# Patient Record
Sex: FEMALE | URBAN - METROPOLITAN AREA
[De-identification: names, ages, dates, MRNs, and addresses within clinical notes are randomized per-mention and may not be internally consistent; named-entity substitution may affect disease eponyms.]

---

## 2017-01-10 DIAGNOSIS — R13.12 OROPHARYNGEAL DYSPHAGIA: Primary | ICD-10-CM

## 2017-01-10 NOTE — NURSING NOTE
The patient is under the care of Dr Wells. She had a surgical procedure done at [facility] in November of 2016. Dr Wells is referring to one of our SLP's for evaluation and help with swallowing and voice production. We will be getting the op report into our system.

## 2017-01-20 ENCOUNTER — HOSPITAL ENCOUNTER (OUTPATIENT)
Dept: SPEECH THERAPY | Facility: CLINIC | Age: 62
Setting detail: THERAPIES SERIES
End: 2017-01-20
Attending: OTOLARYNGOLOGY
Payer: COMMERCIAL

## 2017-01-20 DIAGNOSIS — R13.12 OROPHARYNGEAL DYSPHAGIA: Primary | ICD-10-CM

## 2017-01-20 PROCEDURE — 92526 ORAL FUNCTION THERAPY: CPT | Mod: GN | Performed by: STUDENT IN AN ORGANIZED HEALTH CARE EDUCATION/TRAINING PROGRAM

## 2017-01-20 PROCEDURE — 92610 EVALUATE SWALLOWING FUNCTION: CPT | Mod: GN | Performed by: STUDENT IN AN ORGANIZED HEALTH CARE EDUCATION/TRAINING PROGRAM

## 2017-01-20 PROCEDURE — 40000211 ZZHC STATISTIC SLP  DEPARTMENT VISIT: Performed by: STUDENT IN AN ORGANIZED HEALTH CARE EDUCATION/TRAINING PROGRAM

## 2017-01-20 NOTE — PROGRESS NOTES
"[facility] Clinical Swallow Evaluation       01/20/17 1100   General Information   Type Of Visit Initial   Start Of Care Date 01/20/17   Referring Physician Ori Wells MD   Orders Evaluate And Treat   Orders Comment Per MD order: \"Patient had surgery in November with Dr. Wells at [facility], we will be getting the op report.  She is having swallowing issues as well as voice production issues.\"   Medical Diagnosis Dysphagia, Dysphonia   Onset Of Illness/injury Or Date Of Surgery 10/08/16  (surgery date, per pt report)   Precautions/limitations Swallowing Precautions   Hearing WFL for evaluation   Pertinent History of Current Problem/OT: Additional Occupational Profile Info 60yo female with PMH of nasopharyngeal carcinoma s/p radiation therapy many years ago.  Pt is currently s/p clivectomy on 3/11/2016 with Dr. Cano and sugery on 10/8/2016 with Dr. 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  Pt reports her velum was removed in this most recent surgery.  Pt has been NPO with all nutrition via G-tube since the surgery, with use of ice chips/water swish to maintain oral hydration.  Pt reports coughing/choking on thin liquids and/or nasal regurgitation of liquids if she does not occlude her nares when swallowing.  Trial of puree (yogurt) immediately following surgery felt like it got stuck in the throat.  Pt also reports that her tongue was swollen/numb initially but is now 90% back to normal with some residual numbness along the length of the tonuge on the left.  Pt had VFSS 1+ years ago which was significant for decreased epiglottic inversion, but has not had a VFSS recently.     Respiratory Status Room air   Prior Level Of Function Swallowing   Prior Level Of Function Comment Pt and daughter note that pt decreased epiglottic inversion per VFSS prior to the surgery, with occasional throat clearing and sensation of food getting stuck in " "the throat during meals.   General Observations Significant dysarthria with decreased intelligibility noted.  Flaccid dysarthria with imprecise lingual consonants and significant hypernasal resonance with inability to generate oral pressure necessary for plosives.   Patient/family Goals \"To eat again.\"   Clinical Swallow Evaluation   Oral Musculature anomalies present   Structural Abnormalities present  (lack of velum)   Dentition present and adequate   Mucosal Quality adequate  (pt uses ice chips/water to keep tissue hydrated)   Mandibular Strength and Mobility intact   Oral Labial Strength and Mobility WFL   Lingual Strength and Mobility impaired protrusion;impaired anterior elevation;impaired left lateral movement   Velar Elevation impaired  (velum not present)   Buccal Strength and Mobility intact   Laryngeal Function Cough;Throat clear;Voicing initiated;Dry swallow palpated  (decreased laryngeal elevation on palpation)   Oral Musculature Comments Oral musculature abnormalities characterized by absent velum and reduced lingual strength/ROM.  Pt reports numb sensation along the length of the tongue on the left.  Speech during evaluation is characterzied by flaccid dysarthria and significant hypernasal resonance.   Additional Documentation Yes   Clinical Swallow Eval: Thin Liquid Texture Trial   Mode of Presentation, Thin Liquids cup;self-fed   Volume of Liquid or Food Presented small sip x2   Oral Phase of Swallow other (see comments)  (needs to occlude nares to generate oral pressure for swallow)   Pharyngeal Phase of Swallow coughing/choking;throat clearing;reduction in laryngeal movement;repeated swallows   Diagnostic Statement S/sx of aspiration characterized by immediate cough/throat clear following sips of water noted on 2/2 trials.  Decreased laryngeal elevation on palpation observed.  Pt benefitted from occluding the nares to initiate the swallow.  No improvement with effortful swallow.     Swallow " Compensations   Swallow Compensations Effortful swallow   Results Suspect aspiration   Educational Assessment   Barriers to Learning No barriers   Preferred Learning Style Listening;Reading;Demonstration;Pictures/video   Esophageal Phase of Swallow   Patient reports or presents with symptoms of esophageal dysphagia No   General Therapy Interventions   Planned Therapy Interventions Dysphagia Treatment   Dysphagia treatment Oropharyngeal exercise training   Intervention Comments VFSS warranted.  Speech evaluation to be completed at next session.   Swallow Eval: Clinical Impressions   Skilled Criteria for Therapy Intervention Skilled criteria met.  Treatment indicated.   Functional Assessment Scale (FAS) 1   Dysphagia Outcome Severity Scale (BRAXTON) Level 1 - BRAXTON   Treatment Diagnosis Severe oropharygneal dysphagia   Diet texture recommendations NPO  (ice chips/water swish okay to maintain oral hydration)   Rehab Potential good, to achieve stated therapy goals   Therapy Frequency (once weekly)   Predicted Duration of Therapy Intervention (days/wks) 8   Anticipated Discharge Disposition home w/ outpatient services   Risks and Benefits of Treatment have been explained. Yes   Patient, family and/or staff in agreement with Plan of Care Yes   Clinical Impression Comments Ms. Lopez presents with severe oropharyngeal dysphagia s/p surgery on 10/8/2016 that involved removal of the velum.  Pt has been receiving all nutrition/hydration via G-tube since the October sugery.  Dysphagia is characterized by s/sx of aspiration on sips of water, with decreased laryngeal elevation, reported nasal regurgitation, and need to occlude the nares to initiate the swallow.  Pt has a nose plug, but an obturator has not been recommended by Dr. Wells at this time.  RECOMMEND: 1. repeat video swallow study. 2. Pt to remain NPO until VFSS with use of ice chips/water swish to maintain oral hydration. 3. Oropharyngeal exercises to improve strength  and promote safety with potential future PO trials.   Swallow Goals   SLP Swallow Goals 1   Swallow Goal 1   Goal Identifier Exercises   Goal Description Patient will complete oropharyngeal exercises 2-3x per day, every day, to increase strength and improve safety of future PO intake trials.   Target Date 03/21/17   Total Session Time   Total Session Time 50   Total Evaluation Time 20     Thank you for the referral of this patient.    Allison Alpers, B.A. (music), M.A., CCC-SLP  Speech-Language Pathologist  Certificate of Vocology  [facility]  [phone]

## 2017-01-23 ENCOUNTER — HOSPITAL ENCOUNTER (OUTPATIENT)
Dept: SPEECH THERAPY | Facility: CLINIC | Age: 62
Setting detail: THERAPIES SERIES
End: 2017-01-23
Attending: FAMILY MEDICINE
Payer: COMMERCIAL

## 2017-01-23 DIAGNOSIS — R13.10 DYSPHAGIA, UNSPECIFIED TYPE: Primary | ICD-10-CM

## 2017-01-23 PROCEDURE — 40000211 ZZHC STATISTIC SLP  DEPARTMENT VISIT: Performed by: SPEECH-LANGUAGE PATHOLOGIST

## 2017-01-23 PROCEDURE — 92522 EVALUATE SPEECH PRODUCTION: CPT | Mod: GN | Performed by: SPEECH-LANGUAGE PATHOLOGIST

## 2017-01-25 NOTE — PROGRESS NOTES
Adult Outpatient Speech Evaluation   01/23/17 1100       Present No   General Information   Type of Evaluation Dysarthria   Type Of Visit Initial   Start Of Care Date 01/23/17   Referring Physician Rigo Napoles MD   Orders Evaluate And Treat   Orders Comment Eval for dysarthria.   Medical Diagnosis History of head and neck cancer with surgical removal of velum for spinal repair.   Onset Of Illness/injury Or Date Of Surgery 01/16/17   Precautions/Limitations no known precautions/limitations   Hearing Patient has a hearing aid in her left ear. Her right ear has a bone conduction hearing aid that sends the sounds to the hearing aid in her left ear.   Surgical/Medical history reviewed Yes   Pertinent History Of Current Problem Naya has a complex past medical history. 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 In surgery to remove the infection and repair the spine area, the surgeon removed Naya's soft palate to use the tissue on her spine. As a result of this surgery, Naya also had numbness in her tongue, which has improved, but is still somewhat present. With her soft palate removed, Naya is experiencing dysphagia and dysarthria. Naya had a clinical swallow evaluation on 1/20/17 and has been recommended for a video swallow evaluation, She is here today regarding her speech. Naya was accompanied to this session by her daughter, who reports she understands her mother 90% of the time. They report less familiar people understand her much less. In addition, they report that the phone is nearly impossible for Naya.   Patient Role/employment History Retired  (Previously worked as a drug/alcohol counselor.)   Living environment House/[facility]  (Lives next door to her daughter, who helps out.)   Patient/family Goals To be able to be understood when she  "speaks.   FALL RISK SCREEN   Have you fallen 2 or more times in the last year? No   Have you fallen and had an injury in the past year? No   Is the patient a fall risk? No   Oral Motor Sensory Function   Comments Naya participated in a clinical swallow evaluation on 1/20/17.   Speech   Deficits in Speech Respiration Other (Comment)  (Management of respiration affected by nasal emission.)   Deficits in Phonation None   Sustained vowel production (10-12 seconds with nose plugged, 1-2 seconds without.)   S/Z Ratio 0.22  (With nose plugged. Unknown cause of discrepancy.)   Deficits in Articulation Other (Comment)  (Dysarthria almost exclusively caused by lack of soft palate.)   Deficits in Resonance Hypernasal;Nasal emission of air;Other (Comment)  (No soft palate causes no closure of nasal cavity.)   Deficits in visible velar function Other (Comment)  (Pt's velum was removed in spinal surgery.)   Deficits in [place] None   AIDS-words, % intelligible 70%   AIDS-sentences, % intelligible 86%   Speech Comments Naya presents with moderate to severe dysarthria characterized by a hypernasal quality and inability to produce velar sounds due to the patient's soft palate being removed in surgery. Additionally, following surgery, Naya reported almost no feeling in her tongue. This has gradually improved, with only \"some\" continued numbness. However, this has caused additional speech difficulty with imprecise articulation of all sounds requiring tongue movements. Naya is able to communication with familiar people such as family members with high intelligibility, but estimates less than 50% understanding with new/unfamiliar people. She also reports the phone to be \"nearly impossible\". Naya has self identified some strategies including plugging her nose and mouthing words when she is not understood. She is highly motivated and open to any strategies that can improve her intelligibility.   Language: Auditory " Comprehension (understanding of spoken language)   Comments (Auditory Comprehension) [place] auditory comprehension is functional for all needs.   Language: Verbal Expression (use of spoken language to express information)   Comments (Verbal Expression) [place] verbal expression is functional for all needs. However, her dysarthria does affect her overall intelligibility.   Pragmatics (the social or functional use of a language)   Deficits noted in Nonverbal None   Cognitive Status Examination   Cognitive Status Exam Comments No cognitive deficits observed or reported. Concerns are solely speech related.   Education Assessment   Barriers to Learning No barriers   General Therapy Interventions   Planned Therapy Interventions Communication  (Pt's is also being followed for dysphagia.)   Communication Improve speech intelligibility   Clinical Impression, SLP Eval   Criteria for Skilled Therapeutic Interventions Met yes;treatment indicated   SLP Diagnosis Moderate to severe dysarthria characterized by hypernasal speech, inability to produce velar sounds and imprecise articulation.   Influenced by the following factors/impairments Soft palate was surgically removed.   Functional limitations due to impairments Deficits in speech intelligibility and swallowing.   Therapy Frequency 1 time;per week   Predicted Duration of Therapy Intervention (days/wks) (To be determined based on course of treatment.)   Risks and Benefits of Treatment have been explained. Yes   Patient, Family & other staff in agreement with plan of care Yes   Cognitive/Communication Goals   Cognitive/Communication Goals 1;2   Cognitive/Communication Goal 1   Goal Identifier compensation   Goal Description Naya will trial provided compensatory strategies for improved speech intelligibility.   Target Date 04/23/17   Cognitive/Communication Goal 2   Goal Identifier function   Goal Description Naya will report functional speech with a variety of  listeners in a variety of environments.   Target Date 04/23/17   Total Session Time   Total Evaluation Time 55     Iram Murphy MA, [facility]-SLP  [facility]  [phone] (Phone)  [phone] (Fax)

## 2017-01-31 NOTE — ADDENDUM NOTE
Encounter addended by: Iram Murphy, SLP on: 1/31/2017  9:33 AM<BR>     Documentation filed: Episodes

## 2017-03-09 ENCOUNTER — HOSPITAL ENCOUNTER (OUTPATIENT)
Dept: SPEECH THERAPY | Facility: CLINIC | Age: 62
Discharge: HOME | End: 2017-03-09
Attending: OTOLARYNGOLOGY | Admitting: OTOLARYNGOLOGY
Payer: COMMERCIAL

## 2017-03-09 ENCOUNTER — HOSPITAL ENCOUNTER (OUTPATIENT)
Dept: GENERAL RADIOLOGY | Facility: CLINIC | Age: 62
End: 2017-03-09
Attending: OTOLARYNGOLOGY
Payer: COMMERCIAL

## 2017-03-09 DIAGNOSIS — R13.12 OROPHARYNGEAL DYSPHAGIA: ICD-10-CM

## 2017-03-09 PROCEDURE — 40000211 ZZHC STATISTIC SLP  DEPARTMENT VISIT

## 2017-03-09 PROCEDURE — 74230 X-RAY XM SWLNG FUNCJ C+: CPT

## 2017-03-09 PROCEDURE — 92611 MOTION FLUOROSCOPY/SWALLOW: CPT | Mod: GN

## 2017-03-10 NOTE — PROGRESS NOTES
Impressions: The patient was unable to clear thin or nectar thick liquids. Swallow trials resulted in large quantities of pharyngeal residue. Only a small portion of liquids were cleared from the pharyngeal cavity. Nectar thick liquids were more difficult to clear compared to thin liquids; however, the patient reported that it felt better.  Little to no epiglottis movement was observed. The patient was able to protect airway with cough. Penetration was observed with thin and nectar thick liquids. The patient presented with residue on the posterior side of the epiglottis. Decreased ROM for laryngeal elevation was noted, as well as a decreased pharyngeal stripping wave. Effortful swallow, occluding nares to increase intraoral pressure, chin tuck, and multiple swallows were attempted. Outcome of residue and swallow was not greatly affected with strategies. Patient reported sensation in throat when liquids were not cleared. ST did not trial thicker consistencies as the patient was unable to clear thin and nectar thick from the pharyngeal cavity.   03/09/17  Evaluation: VFSS       Present No   General Information   Type Of Visit Initial   Start Of Care Date 03/09/17   Referring Physician Ori Wells MD   Orders Evaluate And Treat   Medical Diagnosis Oropharyngeal dysphagia   Onset Of Illness/injury Or Date Of Surgery 03/09/17   Precautions/limitations Swallowing Precautions  (H&N cancer/ g-tube/ )   Hearing Hard of Hearing/ wears hearing aides.    Pertinent History of Current Problem/OT: Additional Occupational Profile Info The patient's medical history is complicated.  She had cancer of the nose. The patient underwent surgery in November, where her soft palate was removed. Patient received radiation in past. Patient is receiving nutrients via g-tube. Patient is being seen for OP therapy.    Respiratory Status Room air   Prior Level Of Function Swallowing   Prior Level Of Function  Comment Prior to surgery the patient was PO with no swallowing deficits reported. Patient has been NPO since surgery.    Patient Role/employment History Retired   Living Environment [facility] Observations The patient was alert and oriented at time of evaluation. She reports that she motivated to work toward a PO diet. Patient demonstrated techniques discussed in her therapy session, and verbalized understanding of procedure.    Patient/family Goals Patient would like to eat a regular diet.    Pain Assessment   Pain Reported No   FALL RISK SCREEN   Have you fallen 2 or more times in the last year? No   Have you fallen and had an injury in the past year? No   Is the patient a fall risk? No   Fall screen completed by SLP   Comments Patient walked independent of aid from waiting room to imaging.    Clinical Swallow Evaluation   Oral Musculature anomalies present   Structural Abnormalities present   Dentition present and adequate   Mucosal Quality good   Mandibular Strength and Mobility intact   Oral Labial Strength and Mobility WFL   Lingual Strength and Mobility impaired protrusion;impaired anterior elevation;impaired left lateral movement   Velar Elevation impaired  (absent)   Buccal Strength and Mobility intact   Laryngeal Function Cough;Swallow;Throat clear;Dry swallow palpated;Voicing initiated   Additional Documentation No   VFSS Evaluation   VFSS Additional Documentation Yes   VFSS Eval: Thin Liquid Texture Trial   Mode of Presentation, Thin Liquid cup;self-fed   Order of Presentation 1,2,3   Oral Phase, Thin Liquid Poor AP movement   Pharyngeal Phase, Thin Liquid Pharyngeal wall coating;Residue in valleculae;Residue in pyriform sinus   Rosenbek's Penetration Aspiration Scale: Thin Liquid Trial Results 3 - contrast remains above the vocal cords, visible residue remains (penetration)   Response to Aspiration productive volitional cough following clinician cue   Successful Strategies Trialed During  Procedure, Thin Liquid chin tuck;hard swallow  (Plugging nose)   Diagnostic Statement The patient participated in 3 trials of thin liquids. The first, she attempted to swallow without compensatory strategies. The patient consumed a small sip. The patient required multiple swallows to with very little fluid being transferred to esophagus. The patient demonstrated little to no epiglottis movement. Pharyngeal wave was also significantly reduced. The patient presented effortful swallow with multiple swallows elicited. Patient was unable to clear thin liquids.  With significant effort, patient was able to pass a small portion of thin liquids into esophagus. Remainder of liquids sits in pharyngeal cavity. Penetration noted, but no aspiration was noted. Residue of epiglottis and pharyngeal cavity was present.    VFSS Eval: Nectar Thick Liquid Texture Trial   Mode of Presentation, Nectar cup   Order of Presentation 4, 5,    Preparatory Phase WFL   Oral Phase, [place] Poor AP movement   Pharyngeal Phase, Nectar Pharyngeal wall coating;Residue in valleculae;Residue in pyriform sinus   Rosenbek's Penetration Aspiration Scale: Nectar-Thick Liquid Trial Results 3 - contrast remains above the vocal cords, visible residue remains (penetration)   Response to Aspiration, Nectar productive volitional cough following clinician cue   Successful Strategies Trialed During Procedure, Nectar chin tuck;hard swallow;multiple swallows  (nasal occlusion)   Diagnostic Statement The patient consumed 2 trials of nectar thick liquid. Nectar this liquid appeared to have increased difficulty with swallow. Multiple swallows were required to clear a portion of the sip. The patient presented with little to no epiglottis movement. Excess residue was noted in pharyngeal cavity. The patient was instructed to use an effortful swallow to assist in clearing the liquid. A small portion of the sip was cleared, with the remainder of the liquid sticking in  pharyngeal cavity. A large quantity of the nectar thick liquid remained in pharyngeal cavity, unable to be cleared. Nectar thick was trialed twice with the same results at each attempt. The patient reported a sensation of something being stuck in her throat when this was occurring.    Swallow Compensations   Swallow Compensations Chin tuck;Effortful swallow;Reduce amounts;Multiple swallow  (Increased amount)   Results (Penetrations; residue; unable to clear liquids)   Educational Assessment   Barriers to Learning No barriers   Preferred Learning Style Demonstration;Listening   Esophageal Phase of Swallow   Patient reports or presents with symptoms of esophageal dysphagia No   Esophageal sweep performed during today s vidofluoroscopic exam  No   Swallow Eval: Clinical Impressions   Skilled Criteria for Therapy Intervention (Therapy at another site. )   Functional Assessment Scale (FAS) 1   Dysphagia Outcome Severity Scale (BRAXTON) Level 1 - BRAXTON   Treatment Diagnosis Dysphagia   OT: Assessment of Occupational Performance 3-5 Performance Deficits   OT: Identified Performance Deficits Decreased ROM with epiglottis; decreased pharyngeal wave; decreased laryngeal movement   OT: Clinical Decision Making (Complexity) High complexity   Diet texture recommendations NPO   Anticipated Discharge Disposition home   Risks and Benefits of Treatment have been explained. Yes   Patient, family and/or staff in agreement with Plan of Care Yes   Clinical Impression Comments The patient was unable to clear thin or nectar thick liquids. Swallow trials resulted in large quantities of pharyngeal residue. Only a small portion of liquids were cleared into the esophagus. Nectar thick liquids were more difficult to clear compared to thin liquids; however, the patient reported that it felt better.  Little to no epiglottis movement was observed. The patient was able to protect airway with cough. Penetration was observed with thin and nectar thick  liquids. The patient presented with residue on the posterior side of the epiglottis. Decreased ROM for laryngeal elevation was noted, as well as a decreased pharyngeal stripping wave. Effortful swallow, occluding nares to increase intraoral pressure, chin tuck, and multiple swallows were attempted. Outcome of residue and swallow was not greatly affected with strategies. Patient reported sensation in throat when liquids were not cleared. ST did not trial thicker consistencies as the patient was unable to clear thin and nectar thick from the pharyngeal cavity.   Total Session Time   Total Session Time 30   Total Evaluation Time 25   Therapy Certification   Certification date from 03/10/17   Certification date to 03/10/17   Medical Diagnosis Dysphagia   Certification I certify the need for these services furnished under this plan of treatment and while under my care.  (Physician co-signature of this document indicates review and certification of the therapy plan).

## 2017-05-18 ENCOUNTER — HOSPITAL ENCOUNTER (OUTPATIENT)
Dept: SPEECH THERAPY | Facility: CLINIC | Age: 62
Setting detail: THERAPIES SERIES
End: 2017-05-18
Attending: FAMILY MEDICINE
Payer: COMMERCIAL

## 2017-05-18 PROCEDURE — 40000211 ZZHC STATISTIC SLP  DEPARTMENT VISIT: Performed by: STUDENT IN AN ORGANIZED HEALTH CARE EDUCATION/TRAINING PROGRAM

## 2017-05-18 PROCEDURE — 92507 TX SP LANG VOICE COMM INDIV: CPT | Mod: GN | Performed by: STUDENT IN AN ORGANIZED HEALTH CARE EDUCATION/TRAINING PROGRAM

## 2017-05-18 PROCEDURE — 92526 ORAL FUNCTION THERAPY: CPT | Mod: GN | Performed by: STUDENT IN AN ORGANIZED HEALTH CARE EDUCATION/TRAINING PROGRAM

## 2017-05-26 ENCOUNTER — HOSPITAL ENCOUNTER (OUTPATIENT)
Dept: SPEECH THERAPY | Facility: CLINIC | Age: 62
Setting detail: THERAPIES SERIES
End: 2017-05-26
Attending: FAMILY MEDICINE
Payer: COMMERCIAL

## 2017-05-26 PROCEDURE — 40000211 ZZHC STATISTIC SLP  DEPARTMENT VISIT: Performed by: STUDENT IN AN ORGANIZED HEALTH CARE EDUCATION/TRAINING PROGRAM

## 2017-05-26 PROCEDURE — 92507 TX SP LANG VOICE COMM INDIV: CPT | Mod: GN | Performed by: STUDENT IN AN ORGANIZED HEALTH CARE EDUCATION/TRAINING PROGRAM

## 2017-05-26 PROCEDURE — 92526 ORAL FUNCTION THERAPY: CPT | Mod: GN | Performed by: STUDENT IN AN ORGANIZED HEALTH CARE EDUCATION/TRAINING PROGRAM

## 2017-05-30 NOTE — ADDENDUM NOTE
Encounter addended by: Alpers, Allison E, SLP on: 5/30/2017  7:57 AM<BR>     Actions taken: Charge Capture section accepted

## 2017-06-02 ENCOUNTER — HOSPITAL ENCOUNTER (OUTPATIENT)
Dept: SPEECH THERAPY | Facility: CLINIC | Age: 62
Setting detail: THERAPIES SERIES
End: 2017-06-02
Attending: FAMILY MEDICINE
Payer: COMMERCIAL

## 2017-06-02 PROCEDURE — 40000251 ZZH STATISTIC VOICE CENTER VISIT: Performed by: STUDENT IN AN ORGANIZED HEALTH CARE EDUCATION/TRAINING PROGRAM

## 2017-06-02 PROCEDURE — 92507 TX SP LANG VOICE COMM INDIV: CPT | Mod: GN | Performed by: STUDENT IN AN ORGANIZED HEALTH CARE EDUCATION/TRAINING PROGRAM

## 2017-06-02 PROCEDURE — 92526 ORAL FUNCTION THERAPY: CPT | Mod: GN | Performed by: STUDENT IN AN ORGANIZED HEALTH CARE EDUCATION/TRAINING PROGRAM

## 2017-06-23 ENCOUNTER — HOSPITAL ENCOUNTER (OUTPATIENT)
Dept: SPEECH THERAPY | Facility: CLINIC | Age: 62
Setting detail: THERAPIES SERIES
End: 2017-06-23
Attending: FAMILY MEDICINE
Payer: COMMERCIAL

## 2017-06-23 PROCEDURE — 92526 ORAL FUNCTION THERAPY: CPT | Mod: GN | Performed by: STUDENT IN AN ORGANIZED HEALTH CARE EDUCATION/TRAINING PROGRAM

## 2017-06-23 PROCEDURE — 40000251 ZZH STATISTIC VOICE CENTER VISIT: Performed by: STUDENT IN AN ORGANIZED HEALTH CARE EDUCATION/TRAINING PROGRAM

## 2017-06-23 PROCEDURE — 92507 TX SP LANG VOICE COMM INDIV: CPT | Mod: GN | Performed by: STUDENT IN AN ORGANIZED HEALTH CARE EDUCATION/TRAINING PROGRAM

## 2017-06-30 ENCOUNTER — HOSPITAL ENCOUNTER (OUTPATIENT)
Dept: SPEECH THERAPY | Facility: CLINIC | Age: 62
Setting detail: THERAPIES SERIES
End: 2017-06-30
Attending: FAMILY MEDICINE
Payer: COMMERCIAL

## 2017-06-30 PROCEDURE — 40000251 ZZH STATISTIC VOICE CENTER VISIT: Performed by: STUDENT IN AN ORGANIZED HEALTH CARE EDUCATION/TRAINING PROGRAM

## 2017-06-30 PROCEDURE — 92507 TX SP LANG VOICE COMM INDIV: CPT | Mod: GN | Performed by: STUDENT IN AN ORGANIZED HEALTH CARE EDUCATION/TRAINING PROGRAM

## 2017-06-30 PROCEDURE — 92526 ORAL FUNCTION THERAPY: CPT | Mod: GN | Performed by: STUDENT IN AN ORGANIZED HEALTH CARE EDUCATION/TRAINING PROGRAM

## 2017-07-14 ENCOUNTER — HOSPITAL ENCOUNTER (OUTPATIENT)
Dept: SPEECH THERAPY | Facility: CLINIC | Age: 62
Setting detail: THERAPIES SERIES
End: 2017-07-14
Attending: FAMILY MEDICINE
Payer: COMMERCIAL

## 2017-07-14 PROCEDURE — 92526 ORAL FUNCTION THERAPY: CPT | Mod: GN | Performed by: STUDENT IN AN ORGANIZED HEALTH CARE EDUCATION/TRAINING PROGRAM

## 2017-07-14 PROCEDURE — 92507 TX SP LANG VOICE COMM INDIV: CPT | Mod: GN | Performed by: STUDENT IN AN ORGANIZED HEALTH CARE EDUCATION/TRAINING PROGRAM

## 2017-07-14 PROCEDURE — 40000251 ZZH STATISTIC VOICE CENTER VISIT: Performed by: STUDENT IN AN ORGANIZED HEALTH CARE EDUCATION/TRAINING PROGRAM

## 2017-07-18 NOTE — ADDENDUM NOTE
Encounter addended by: Alpers, Allison E, SLP on: 7/18/2017  1:31 PM<BR>     Actions taken: Flowsheet accepted

## 2017-07-19 DIAGNOSIS — R13.10 DYSPHAGIA, UNSPECIFIED TYPE: Primary | ICD-10-CM

## 2017-07-28 ENCOUNTER — HOSPITAL ENCOUNTER (OUTPATIENT)
Dept: SPEECH THERAPY | Facility: CLINIC | Age: 62
Setting detail: THERAPIES SERIES
End: 2017-07-28
Attending: FAMILY MEDICINE
Payer: COMMERCIAL

## 2017-07-28 PROCEDURE — 40000251 ZZH STATISTIC VOICE CENTER VISIT: Performed by: STUDENT IN AN ORGANIZED HEALTH CARE EDUCATION/TRAINING PROGRAM

## 2017-07-28 PROCEDURE — 92526 ORAL FUNCTION THERAPY: CPT | Mod: GN | Performed by: STUDENT IN AN ORGANIZED HEALTH CARE EDUCATION/TRAINING PROGRAM

## 2017-08-04 ENCOUNTER — HOSPITAL ENCOUNTER (OUTPATIENT)
Dept: SPEECH THERAPY | Facility: CLINIC | Age: 62
Setting detail: THERAPIES SERIES
End: 2017-08-04
Attending: FAMILY MEDICINE
Payer: COMMERCIAL

## 2017-08-04 PROCEDURE — 40000251 ZZH STATISTIC VOICE CENTER VISIT: Performed by: STUDENT IN AN ORGANIZED HEALTH CARE EDUCATION/TRAINING PROGRAM

## 2017-08-04 PROCEDURE — 92526 ORAL FUNCTION THERAPY: CPT | Mod: GN | Performed by: STUDENT IN AN ORGANIZED HEALTH CARE EDUCATION/TRAINING PROGRAM

## 2017-08-11 ENCOUNTER — HOSPITAL ENCOUNTER (OUTPATIENT)
Dept: SPEECH THERAPY | Facility: CLINIC | Age: 62
Setting detail: THERAPIES SERIES
End: 2017-08-11
Attending: FAMILY MEDICINE
Payer: COMMERCIAL

## 2017-08-11 PROCEDURE — 92526 ORAL FUNCTION THERAPY: CPT | Mod: GN | Performed by: STUDENT IN AN ORGANIZED HEALTH CARE EDUCATION/TRAINING PROGRAM

## 2017-08-11 PROCEDURE — 40000251 ZZH STATISTIC VOICE CENTER VISIT: Performed by: STUDENT IN AN ORGANIZED HEALTH CARE EDUCATION/TRAINING PROGRAM

## 2017-08-11 PROCEDURE — 92507 TX SP LANG VOICE COMM INDIV: CPT | Mod: GN | Performed by: STUDENT IN AN ORGANIZED HEALTH CARE EDUCATION/TRAINING PROGRAM

## 2017-08-14 DIAGNOSIS — R13.10 DYSPHAGIA, UNSPECIFIED TYPE: Primary | ICD-10-CM

## 2017-08-14 DIAGNOSIS — R13.12 OROPHARYNGEAL DYSPHAGIA: Primary | ICD-10-CM

## 2017-08-17 DIAGNOSIS — R13.10 DYSPHAGIA, UNSPECIFIED TYPE: Primary | ICD-10-CM

## 2017-08-18 ENCOUNTER — HOSPITAL ENCOUNTER (OUTPATIENT)
Dept: SPEECH THERAPY | Facility: CLINIC | Age: 62
Setting detail: THERAPIES SERIES
End: 2017-08-18
Attending: FAMILY MEDICINE
Payer: COMMERCIAL

## 2017-08-18 PROCEDURE — 92526 ORAL FUNCTION THERAPY: CPT | Mod: GN | Performed by: STUDENT IN AN ORGANIZED HEALTH CARE EDUCATION/TRAINING PROGRAM

## 2017-08-18 PROCEDURE — 40000251 ZZH STATISTIC VOICE CENTER VISIT: Performed by: STUDENT IN AN ORGANIZED HEALTH CARE EDUCATION/TRAINING PROGRAM

## 2017-08-22 ENCOUNTER — HOSPITAL ENCOUNTER (OUTPATIENT)
Dept: SPEECH THERAPY | Facility: CLINIC | Age: 62
Setting detail: THERAPIES SERIES
End: 2017-08-22
Attending: FAMILY MEDICINE
Payer: COMMERCIAL

## 2017-08-22 PROCEDURE — 92526 ORAL FUNCTION THERAPY: CPT | Mod: GN | Performed by: STUDENT IN AN ORGANIZED HEALTH CARE EDUCATION/TRAINING PROGRAM

## 2017-08-22 PROCEDURE — 40000251 ZZH STATISTIC VOICE CENTER VISIT: Performed by: STUDENT IN AN ORGANIZED HEALTH CARE EDUCATION/TRAINING PROGRAM

## 2017-08-25 ENCOUNTER — HOSPITAL ENCOUNTER (OUTPATIENT)
Dept: SPEECH THERAPY | Facility: CLINIC | Age: 62
Setting detail: THERAPIES SERIES
End: 2017-08-25
Attending: FAMILY MEDICINE
Payer: COMMERCIAL

## 2017-08-25 PROCEDURE — 92526 ORAL FUNCTION THERAPY: CPT | Mod: GN | Performed by: STUDENT IN AN ORGANIZED HEALTH CARE EDUCATION/TRAINING PROGRAM

## 2017-08-25 PROCEDURE — 40000251 ZZH STATISTIC VOICE CENTER VISIT: Performed by: STUDENT IN AN ORGANIZED HEALTH CARE EDUCATION/TRAINING PROGRAM

## 2017-08-29 ENCOUNTER — HOSPITAL ENCOUNTER (OUTPATIENT)
Dept: SPEECH THERAPY | Facility: CLINIC | Age: 62
End: 2017-08-29
Attending: OTOLARYNGOLOGY
Payer: COMMERCIAL

## 2017-08-29 ENCOUNTER — HOSPITAL ENCOUNTER (OUTPATIENT)
Dept: GENERAL RADIOLOGY | Facility: CLINIC | Age: 62
Discharge: HOME | End: 2017-08-29
Attending: OTOLARYNGOLOGY | Admitting: OTOLARYNGOLOGY
Payer: COMMERCIAL

## 2017-08-29 DIAGNOSIS — R13.12 OROPHARYNGEAL DYSPHAGIA: ICD-10-CM

## 2017-08-29 PROCEDURE — 92611 MOTION FLUOROSCOPY/SWALLOW: CPT | Mod: GN | Performed by: SPEECH-LANGUAGE PATHOLOGIST

## 2017-08-29 PROCEDURE — 92526 ORAL FUNCTION THERAPY: CPT | Mod: GN | Performed by: SPEECH-LANGUAGE PATHOLOGIST

## 2017-08-29 PROCEDURE — 74230 X-RAY XM SWLNG FUNCJ C+: CPT

## 2017-08-29 PROCEDURE — 40000211 ZZHC STATISTIC SLP  DEPARTMENT VISIT: Performed by: SPEECH-LANGUAGE PATHOLOGIST

## 2017-08-29 RX ORDER — BARIUM SULFATE 400 MG/ML
SUSPENSION ORAL ONCE
Status: COMPLETED | OUTPATIENT
Start: 2017-08-29 | End: 2017-08-29

## 2017-08-29 RX ADMIN — BARIUM SULFATE 5 ML: 400 SUSPENSION ORAL at 14:25

## 2017-08-29 NOTE — PROGRESS NOTES
08/29/17 1619   General Information   Type Of Visit Initial   Start Of Care Date 08/29/17   Referring Physician Dr. Ori Wells   Orders Comment 8/17/17   Medical Diagnosis Dysphagia   Patient/family Goals To receive pleasure feeding including tea.   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  Her most recent surgery was last October, in which her velum was removed.  She uses an obturator.  She has been NPO at home with nutrition via G-tube, although water trials provided in therapy.  In March, the video swallow indicated significant residue of thin and nectar thick liquids with little to no epiglottis movement. Therapy has included oropharyngeal strengthening exercises as well as use of a chin tuck and modified supraglottic swallow (swallow, throat clear, swallow, breathe) with water trials in therapy.  Today's study was requested to assess for improved oropharyngeal function/strength, and if compensatory strategies improve safety.  She has some occasional coughing during water trials, but she feels that this is more related to mucus/PND from her obturator than it is to the water going down the wrong way. Qustion of use of the free water protocol and/or clear liquids in general appropriate.  She understands that she will likely need some nutrition via G-tube long term, but being able to swallow at least something at home would greatly improve her quality of life.      Obturator in place during today's study.   FALL RISK SCREEN   Comments See radiology staff documentation.   VFSS Eval: Radiology   Radiologist Dr. Alexandre   Views Taken left lateral   Physical Location of Procedure [facility]   VFSS Eval: Thin Liquid Texture Trial   Mode of Presentation, Thin Liquid spoon   Order of Presentation 2, 3   Preparatory Phase Poor bolus control   Oral Phase, Thin Liquid Premature pharyngeal entry   Pharyngeal Phase, Thin Liquid Delayed swallow  reflex   Rosenbek's Penetration Aspiration Scale: Thin Liquid Trial Results 7 - contrast passes glottis, visible subglottic residue remains despite patient's response (aspiration)   Diagnostic Statement Epiglottis does not invert with only some posterior movement observed, delayed swallow reflex to the pyriform sinuses, decreased pharyngeal peristalsis, severe pharyngeal residue, repeated deep penetration/penetration to the vocal folds with each swallow attempt, piecemeal swallows, multiple swalllows (3-4) does not clear residue, slight chin tuck not effective, repeated modified swallow-cough-swallow sequence does not prevent repeated penetration or eventual small amounts of aspiration   VFSS Eval: Nectar Thick Liquid Texture Trial   Mode of Presentation, [place] spoon   Order of Presentation 1 (not recorded)   Preparatory Phase Poor bolus control   Oral Phase, Nectar Premature pharyngeal entry   Pharyngeal Phase, Nectar Delayed swallow reflex   Rosenbek's Penetration Aspiration Scale: Nectar-Thick Liquid Trial Results 7 - contrast passes glottis, visible subglottic residue remains despite patient's response (aspiration)  (noted after first trial on next frame 2 per recording review)   Diagnostic Statement Epiglottis does not invert with only some posterior movement observed, delayed swallow reflex to the pyriform sinuses, decreased pharyngeal peristalsis, severe pharyngeal residue, repeated deep penetration/penetration to the vocal folds with each swallow attempt, piecemeal swallows, multiple swalllows does not clear residue, eventual small amount of aspiration noted on residue despite cues to cough and swallow after initial trial   VFSS Eval: Honey Thick Texture Trial   Order of Presentation Did not test due to decreased epiglottic function and pharyngeal residue with thin and nectar trials   VFSS Eval: Puree Solid Texture Trial   Order of Presentation Did not test due to decreased epiglottic function and  pharyngeal residue with thin and nectar trials   VFSS Eval: Semisolid Texture Trial   Order of Presentation Did not test due to decreased epiglottic function and pharyngeal residue with thin and nectar trials   VFSS Eval: Solid Food Texture Trial   Order of Presentation Did not test due to decreased epiglottic function and pharyngeal residue with thin and nectar trials   Swallow Eval: Clinical Impressions   Skilled Criteria for Therapy Intervention Skilled criteria met.  Treatment indicated.   Functional Assessment Scale (FAS) 1   Dysphagia Outcome Severity Scale (BRAXTON) Level 1 - BRAXTON   Treatment Diagnosis severe oral-pharyngeal dysphagia   Diet texture recommendations NPO   Rehab Potential fair, will monitor progress closely   Therapy Frequency (1x/week)   Predicted Duration of Therapy Intervention (days/wks) 12 weeks   Anticipated Discharge Disposition home w/ outpatient services   Risks and Benefits of Treatment have been explained. Yes   Patient, family and/or staff in agreement with Plan of Care Yes   Clinical Impression Comments Patient presents with severe oral-pharyngeal dysphagia per study results.  Deficits include no epiglottic inversion with only some posterior movement observed, delayed swallow reflex to the pyriform sinuses, and decreased pharyngeal peristalsis.  Deficits resulted in severe pharyngeal residue, repeated deep penetration/penetration to the vocal folds with each swallow attempt, piecemeal swallows, and eventual aspiration with both nectar and thin liquids.  Multiple swalllows (3-4) did not clear residue, slight chin tuck was not effective, and repeated modified swallow-cough-swallow sequence does not prevent repeated penetration or eventual small amounts of aspiration.  Patient may have slight improved posterior movement of epiglottis/posterior pharyngeal wall movement compared to the initial swallow study; however, overall impaired swallow function and high aspiration risk remain.   Recommend continued NPO status, continued trial period of swallow Tx exercises, and consideration of limited free water protocol for quality of life purposes with known high aspiration risk.  Recommend clinical MD, OP SLP, patient, and family discussion regarding a limited free water protocol in the setting of known aspiration risk.  Recommend limiting trials to ice chips vs 1/2 tsps of thin water by spoon with the following precautions: full oral cares prior to trials, limit to 1-3 trials at a sitting, swallow-cough-swallow sequence repeated x 10 per trial, close monitoring of lung status.  Consider a repeat video swallow study in 2-3 months to assess for any improvement in swallow function or safety if swallow Tx continued.   Swallow Goal 1   Goal Identifier Exercises   Goal Description Patient will complete oropharyngeal exercises 2-3x per day, every day, to increase strength and improve safety of future PO intake trials.   Target Date 10/30/17   Total Session Time   Total Session Time 1:40pm-1:55pm (15 minutes) video swallow study; 1:55pm-2:15pm (20 minutes) swallow Tx

## 2017-09-15 ENCOUNTER — HOSPITAL ENCOUNTER (OUTPATIENT)
Dept: SPEECH THERAPY | Facility: CLINIC | Age: 62
Setting detail: THERAPIES SERIES
End: 2017-09-15
Attending: FAMILY MEDICINE
Payer: COMMERCIAL

## 2017-09-15 PROCEDURE — 40000211 ZZHC STATISTIC SLP  DEPARTMENT VISIT: Performed by: STUDENT IN AN ORGANIZED HEALTH CARE EDUCATION/TRAINING PROGRAM

## 2017-09-15 PROCEDURE — 92526 ORAL FUNCTION THERAPY: CPT | Mod: GN | Performed by: STUDENT IN AN ORGANIZED HEALTH CARE EDUCATION/TRAINING PROGRAM

## 2017-09-15 NOTE — DISCHARGE INSTRUCTIONS
Swallowing Therapy  9/14/2017      Free Water Protocol  o Complete oral cares and clean out obturator immediately prior to swallowing  o Small sips of water or ice chips only  - About a tablespoon in quantity   o Only 2-3 sips per time, with many (5-10) swallows and coughs/throat clears per sip  o Twice per day at most  o Carefully monitor your lung status; if you have any changes in breathing, stop immediately.     Strengthening exercises  o Aim for practicing swallow exercises 2-3x/day, 5-7 days a week  o At least 10 repetitions per exercise each time you practice  o This frequency is necessary to have any change    Records transfer  o Call clinic if you re going to change locations  o Once you receive records permission form, fill it out and send it back if necessary

## 2020-03-25 NOTE — ADDENDUM NOTE
Encounter addended by: Alpers, Allison E, SLP on: 3/25/2020 3:12 PM   Actions taken: Episode resolved